# Patient Record
Sex: MALE | Race: WHITE | NOT HISPANIC OR LATINO
[De-identification: names, ages, dates, MRNs, and addresses within clinical notes are randomized per-mention and may not be internally consistent; named-entity substitution may affect disease eponyms.]

---

## 2018-12-04 PROBLEM — Z00.00 ENCOUNTER FOR PREVENTIVE HEALTH EXAMINATION: Status: ACTIVE | Noted: 2018-12-04

## 2018-12-06 ENCOUNTER — TRANSCRIPTION ENCOUNTER (OUTPATIENT)
Age: 43
End: 2018-12-06

## 2018-12-06 ENCOUNTER — APPOINTMENT (OUTPATIENT)
Dept: BARIATRICS | Facility: CLINIC | Age: 43
End: 2018-12-06
Payer: COMMERCIAL

## 2018-12-06 VITALS
HEART RATE: 85 BPM | SYSTOLIC BLOOD PRESSURE: 163 MMHG | TEMPERATURE: 97.8 F | HEIGHT: 68 IN | BODY MASS INDEX: 38.12 KG/M2 | DIASTOLIC BLOOD PRESSURE: 99 MMHG | OXYGEN SATURATION: 100 % | WEIGHT: 251.5 LBS

## 2018-12-06 DIAGNOSIS — I87.1 COMPRESSION OF VEIN: ICD-10-CM

## 2018-12-06 DIAGNOSIS — Z86.718 PERSONAL HISTORY OF OTHER VENOUS THROMBOSIS AND EMBOLISM: ICD-10-CM

## 2018-12-06 DIAGNOSIS — I10 ESSENTIAL (PRIMARY) HYPERTENSION: ICD-10-CM

## 2018-12-06 PROCEDURE — 99203 OFFICE O/P NEW LOW 30 MIN: CPT

## 2018-12-13 ENCOUNTER — APPOINTMENT (OUTPATIENT)
Dept: BARIATRICS | Facility: CLINIC | Age: 43
End: 2018-12-13
Payer: COMMERCIAL

## 2018-12-13 VITALS — WEIGHT: 249 LBS | BODY MASS INDEX: 37.74 KG/M2 | HEIGHT: 68 IN

## 2018-12-13 DIAGNOSIS — E78.00 PURE HYPERCHOLESTEROLEMIA, UNSPECIFIED: ICD-10-CM

## 2018-12-13 PROCEDURE — 97802 MEDICAL NUTRITION INDIV IN: CPT

## 2019-01-16 ENCOUNTER — APPOINTMENT (OUTPATIENT)
Dept: BARIATRICS | Facility: CLINIC | Age: 44
End: 2019-01-16

## 2019-01-23 ENCOUNTER — APPOINTMENT (OUTPATIENT)
Dept: RADIOLOGY | Facility: HOSPITAL | Age: 44
End: 2019-01-23

## 2019-02-11 ENCOUNTER — OTHER (OUTPATIENT)
Age: 44
End: 2019-02-11

## 2019-02-14 ENCOUNTER — APPOINTMENT (OUTPATIENT)
Dept: BARIATRICS | Facility: CLINIC | Age: 44
End: 2019-02-14
Payer: MEDICAID

## 2019-02-14 VITALS
DIASTOLIC BLOOD PRESSURE: 92 MMHG | HEART RATE: 92 BPM | WEIGHT: 260 LBS | SYSTOLIC BLOOD PRESSURE: 136 MMHG | HEIGHT: 68 IN | BODY MASS INDEX: 39.4 KG/M2 | OXYGEN SATURATION: 97 %

## 2019-02-14 DIAGNOSIS — E66.01 MORBID (SEVERE) OBESITY DUE TO EXCESS CALORIES: ICD-10-CM

## 2019-02-14 PROCEDURE — 99213 OFFICE O/P EST LOW 20 MIN: CPT

## 2019-02-22 ENCOUNTER — OTHER (OUTPATIENT)
Age: 44
End: 2019-02-22

## 2019-02-25 VITALS
DIASTOLIC BLOOD PRESSURE: 79 MMHG | SYSTOLIC BLOOD PRESSURE: 121 MMHG | WEIGHT: 253.97 LBS | HEIGHT: 68 IN | HEART RATE: 88 BPM | RESPIRATION RATE: 16 BRPM | TEMPERATURE: 97 F | OXYGEN SATURATION: 97 %

## 2019-02-26 ENCOUNTER — RESULT REVIEW (OUTPATIENT)
Age: 44
End: 2019-02-26

## 2019-02-26 ENCOUNTER — APPOINTMENT (OUTPATIENT)
Dept: BARIATRICS | Facility: HOSPITAL | Age: 44
End: 2019-02-26

## 2019-02-26 ENCOUNTER — INPATIENT (INPATIENT)
Facility: HOSPITAL | Age: 44
LOS: 1 days | Discharge: HOME | DRG: 621 | End: 2019-02-28
Attending: SURGERY | Admitting: SURGERY
Payer: MEDICAID

## 2019-02-26 DIAGNOSIS — Z98.890 OTHER SPECIFIED POSTPROCEDURAL STATES: Chronic | ICD-10-CM

## 2019-02-26 LAB
HCT VFR BLD CALC: 45.1 % — SIGNIFICANT CHANGE UP (ref 39–50)
HGB BLD-MCNC: 14.8 G/DL — SIGNIFICANT CHANGE UP (ref 13–17)
MCHC RBC-ENTMCNC: 28.6 PG — SIGNIFICANT CHANGE UP (ref 27–34)
MCHC RBC-ENTMCNC: 32.8 GM/DL — SIGNIFICANT CHANGE UP (ref 32–36)
MCV RBC AUTO: 87.2 FL — SIGNIFICANT CHANGE UP (ref 80–100)
NRBC # BLD: 0 /100 WBCS — SIGNIFICANT CHANGE UP (ref 0–0)
PLATELET # BLD AUTO: 202 K/UL — SIGNIFICANT CHANGE UP (ref 150–400)
RBC # BLD: 5.17 M/UL — SIGNIFICANT CHANGE UP (ref 4.2–5.8)
RBC # FLD: 11.9 % — SIGNIFICANT CHANGE UP (ref 10.3–14.5)
WBC # BLD: 11.3 K/UL — HIGH (ref 3.8–10.5)
WBC # FLD AUTO: 11.3 K/UL — HIGH (ref 3.8–10.5)

## 2019-02-26 PROCEDURE — 43775 LAP SLEEVE GASTRECTOMY: CPT | Mod: GC

## 2019-02-26 RX ORDER — ENOXAPARIN SODIUM 100 MG/ML
40 INJECTION SUBCUTANEOUS EVERY 12 HOURS
Qty: 0 | Refills: 0 | Status: DISCONTINUED | OUTPATIENT
Start: 2019-02-26 | End: 2019-02-28

## 2019-02-26 RX ORDER — ONDANSETRON 8 MG/1
4 TABLET, FILM COATED ORAL EVERY 6 HOURS
Qty: 0 | Refills: 0 | Status: DISCONTINUED | OUTPATIENT
Start: 2019-02-26 | End: 2019-02-28

## 2019-02-26 RX ORDER — LOSARTAN POTASSIUM 100 MG/1
50 TABLET, FILM COATED ORAL DAILY
Qty: 0 | Refills: 0 | Status: DISCONTINUED | OUTPATIENT
Start: 2019-02-26 | End: 2019-02-28

## 2019-02-26 RX ORDER — ENOXAPARIN SODIUM 100 MG/ML
40 INJECTION SUBCUTANEOUS ONCE
Qty: 0 | Refills: 0 | Status: DISCONTINUED | OUTPATIENT
Start: 2019-02-26 | End: 2019-02-26

## 2019-02-26 RX ORDER — OXYCODONE AND ACETAMINOPHEN 5; 325 MG/1; MG/1
1 TABLET ORAL EVERY 4 HOURS
Qty: 0 | Refills: 0 | Status: DISCONTINUED | OUTPATIENT
Start: 2019-02-26 | End: 2019-02-28

## 2019-02-26 RX ORDER — HYDROMORPHONE HYDROCHLORIDE 2 MG/ML
1 INJECTION INTRAMUSCULAR; INTRAVENOUS; SUBCUTANEOUS EVERY 8 HOURS
Qty: 0 | Refills: 0 | Status: DISCONTINUED | OUTPATIENT
Start: 2019-02-26 | End: 2019-02-27

## 2019-02-26 RX ORDER — LOSARTAN POTASSIUM 100 MG/1
1 TABLET, FILM COATED ORAL
Qty: 0 | Refills: 0 | COMMUNITY

## 2019-02-26 RX ORDER — HYDROMORPHONE HYDROCHLORIDE 2 MG/ML
0.5 INJECTION INTRAMUSCULAR; INTRAVENOUS; SUBCUTANEOUS EVERY 8 HOURS
Qty: 0 | Refills: 0 | Status: DISCONTINUED | OUTPATIENT
Start: 2019-02-26 | End: 2019-02-27

## 2019-02-26 RX ORDER — SODIUM CHLORIDE 9 MG/ML
1000 INJECTION, SOLUTION INTRAVENOUS
Qty: 0 | Refills: 0 | Status: DISCONTINUED | OUTPATIENT
Start: 2019-02-26 | End: 2019-02-27

## 2019-02-26 RX ORDER — ENOXAPARIN SODIUM 100 MG/ML
40 INJECTION SUBCUTANEOUS ONCE
Qty: 0 | Refills: 0 | Status: COMPLETED | OUTPATIENT
Start: 2019-02-26 | End: 2019-02-26

## 2019-02-26 RX ORDER — PANTOPRAZOLE SODIUM 20 MG/1
40 TABLET, DELAYED RELEASE ORAL EVERY 24 HOURS
Qty: 0 | Refills: 0 | Status: DISCONTINUED | OUTPATIENT
Start: 2019-02-26 | End: 2019-02-28

## 2019-02-26 RX ORDER — SCOPALAMINE 1 MG/3D
1 PATCH, EXTENDED RELEASE TRANSDERMAL ONCE
Qty: 0 | Refills: 0 | Status: COMPLETED | OUTPATIENT
Start: 2019-02-26 | End: 2019-02-26

## 2019-02-26 RX ADMIN — HYDROMORPHONE HYDROCHLORIDE 1 MILLIGRAM(S): 2 INJECTION INTRAMUSCULAR; INTRAVENOUS; SUBCUTANEOUS at 23:14

## 2019-02-26 RX ADMIN — PANTOPRAZOLE SODIUM 40 MILLIGRAM(S): 20 TABLET, DELAYED RELEASE ORAL at 14:05

## 2019-02-26 RX ADMIN — SODIUM CHLORIDE 150 MILLILITER(S): 9 INJECTION, SOLUTION INTRAVENOUS at 14:03

## 2019-02-26 RX ADMIN — HYDROMORPHONE HYDROCHLORIDE 0.5 MILLIGRAM(S): 2 INJECTION INTRAMUSCULAR; INTRAVENOUS; SUBCUTANEOUS at 15:01

## 2019-02-26 RX ADMIN — HYDROMORPHONE HYDROCHLORIDE 1 MILLIGRAM(S): 2 INJECTION INTRAMUSCULAR; INTRAVENOUS; SUBCUTANEOUS at 23:38

## 2019-02-26 RX ADMIN — LOSARTAN POTASSIUM 50 MILLIGRAM(S): 100 TABLET, FILM COATED ORAL at 21:16

## 2019-02-26 RX ADMIN — ENOXAPARIN SODIUM 40 MILLIGRAM(S): 100 INJECTION SUBCUTANEOUS at 09:16

## 2019-02-26 RX ADMIN — SCOPALAMINE 1 PATCH: 1 PATCH, EXTENDED RELEASE TRANSDERMAL at 09:16

## 2019-02-26 RX ADMIN — ENOXAPARIN SODIUM 40 MILLIGRAM(S): 100 INJECTION SUBCUTANEOUS at 19:39

## 2019-02-26 RX ADMIN — SCOPALAMINE 1 PATCH: 1 PATCH, EXTENDED RELEASE TRANSDERMAL at 19:08

## 2019-02-26 RX ADMIN — ONDANSETRON 4 MILLIGRAM(S): 8 TABLET, FILM COATED ORAL at 23:14

## 2019-02-26 NOTE — PROGRESS NOTE ADULT - ASSESSMENT
42 yo M w hx of HTN, LLE DVT 2/2 May-Thurner's, s/p L iliac vein placement presenting for elective laparoscopic robot-assisted sleeve gastrectomy'    Plan  Pain/nausea control  BCLD/IVF - LR@ 150  PPI  VTE ppx  - Lovenox 40 BID in 12 hrs after preop dose (hx of LLE DVT)  OOB/Amb/SCDs  Home meds  Nutrition Consults  f/u post op labs  f/u AM labs  - restart plavix if hgb stable

## 2019-02-26 NOTE — H&P ADULT - ASSESSMENT
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

## 2019-02-26 NOTE — BRIEF OPERATIVE NOTE - PROCEDURE
<<-----Click on this checkbox to enter Procedure Laparoscopic sleeve gastrectomy  02/26/2019  robot-assisted  Active  TANYA

## 2019-02-26 NOTE — BRIEF OPERATIVE NOTE - OPERATION/FINDINGS
Procedure: laparoscopic robot-assisted sleeve gastrectomy over 38 Fr bougie    Findings:   - extensive adhesions appreciated against R anterior wall, obscuring view of intended port entry sites  - adhesions taken down before robot positioning  - greater omentum divided off greater curvature of stomach  - from a few cm from pyloric valve and along curvature cephalad toward angle of His  - no hiatal hernia appreciated  - 38 Fr blunt tip bougie placed in stomach along lesser curvature  - sleeve gastrectomy performed using a combination of black and blue cartridges  - upper 1/3 of staple line then oversewn w/ running 2-0 PDS, then greater omentum sewn back on to new greater curvature of stomach  - resected portion of stomach removed and sent to path  - fascia closed with figure of 8 stitch w/ 0 vicryl   - port sites then closed with monocryl and dermabond

## 2019-02-26 NOTE — PROGRESS NOTE ADULT - SUBJECTIVE AND OBJECTIVE BOX
POST-OPERATIVE NOTE    Procedure: Laparoscopic sleeve gastrectomy     Diagnosis/Indication: morbid obesity     Surgeon: Dr. Mckeon     S: Pt has no complaints. Denies CP, SOB, CASTRO, calf tenderness. Pain controlled with medication.    O:  T(C): 36 (02-26-19 @ 13:45), Max: 36 (02-26-19 @ 13:45)  T(F): 96.8 (02-26-19 @ 13:45), Max: 96.8 (02-26-19 @ 13:45)  HR: 80 (02-26-19 @ 16:00) (74 - 94)  BP: 145/83 (02-26-19 @ 16:00) (119/67 - 150/82)  RR: 14 (02-26-19 @ 16:00) (10 - 15)  SpO2: 94% (02-26-19 @ 16:00) (94% - 99%)  Wt(kg): --            Gen: NAD, resting comfortably in bed  C/V: NSR  Pulm: Nonlabored breathing, no respiratory distress  Abd:  Soft, non- distended, TTP around incision site, incision clean dry and intact   Extrem: WWP, no calf edema, SCDs in place

## 2019-02-26 NOTE — H&P ADULT - HISTORY OF PRESENT ILLNESS
42 yo M w hx of HTN, LLE DVT 2/2 May-Thurner's, s/p L iliac vein placement presenting for elective laparoscopic robot-assisted sleeve gastrectomy after failing significant weight loss with a regimen of diet and exercise. Patient underwent a complete evaluation including medical, psychological, and nutritional clearance. No complaints or concerns at this time.     PMH: HTN, DVT, May-Thurner's  Meds: Cozaar, Plavix  PSH: Liposuction, L iliac vein stent placement  Allergies: None

## 2019-02-27 ENCOUNTER — TRANSCRIPTION ENCOUNTER (OUTPATIENT)
Age: 44
End: 2019-02-27

## 2019-02-27 LAB
ANION GAP SERPL CALC-SCNC: 10 MMOL/L — SIGNIFICANT CHANGE UP (ref 5–17)
BUN SERPL-MCNC: 10 MG/DL — SIGNIFICANT CHANGE UP (ref 7–23)
CALCIUM SERPL-MCNC: 9.3 MG/DL — SIGNIFICANT CHANGE UP (ref 8.4–10.5)
CHLORIDE SERPL-SCNC: 102 MMOL/L — SIGNIFICANT CHANGE UP (ref 96–108)
CO2 SERPL-SCNC: 27 MMOL/L — SIGNIFICANT CHANGE UP (ref 22–31)
CREAT SERPL-MCNC: 0.82 MG/DL — SIGNIFICANT CHANGE UP (ref 0.5–1.3)
GLUCOSE SERPL-MCNC: 123 MG/DL — HIGH (ref 70–99)
HCT VFR BLD CALC: 42.1 % — SIGNIFICANT CHANGE UP (ref 39–50)
HGB BLD-MCNC: 13.6 G/DL — SIGNIFICANT CHANGE UP (ref 13–17)
MAGNESIUM SERPL-MCNC: 1.9 MG/DL — SIGNIFICANT CHANGE UP (ref 1.6–2.6)
MCHC RBC-ENTMCNC: 28.3 PG — SIGNIFICANT CHANGE UP (ref 27–34)
MCHC RBC-ENTMCNC: 32.3 GM/DL — SIGNIFICANT CHANGE UP (ref 32–36)
MCV RBC AUTO: 87.7 FL — SIGNIFICANT CHANGE UP (ref 80–100)
NRBC # BLD: 0 /100 WBCS — SIGNIFICANT CHANGE UP (ref 0–0)
PHOSPHATE SERPL-MCNC: 3.8 MG/DL — SIGNIFICANT CHANGE UP (ref 2.5–4.5)
PLATELET # BLD AUTO: 203 K/UL — SIGNIFICANT CHANGE UP (ref 150–400)
POTASSIUM SERPL-MCNC: 4.6 MMOL/L — SIGNIFICANT CHANGE UP (ref 3.5–5.3)
POTASSIUM SERPL-SCNC: 4.6 MMOL/L — SIGNIFICANT CHANGE UP (ref 3.5–5.3)
RBC # BLD: 4.8 M/UL — SIGNIFICANT CHANGE UP (ref 4.2–5.8)
RBC # FLD: 12.1 % — SIGNIFICANT CHANGE UP (ref 10.3–14.5)
SODIUM SERPL-SCNC: 139 MMOL/L — SIGNIFICANT CHANGE UP (ref 135–145)
WBC # BLD: 12.26 K/UL — HIGH (ref 3.8–10.5)
WBC # FLD AUTO: 12.26 K/UL — HIGH (ref 3.8–10.5)

## 2019-02-27 RX ORDER — OXYCODONE AND ACETAMINOPHEN 5; 325 MG/1; MG/1
2 TABLET ORAL EVERY 4 HOURS
Qty: 0 | Refills: 0 | Status: DISCONTINUED | OUTPATIENT
Start: 2019-02-27 | End: 2019-02-28

## 2019-02-27 RX ORDER — OMEPRAZOLE 10 MG/1
1 CAPSULE, DELAYED RELEASE ORAL
Qty: 30 | Refills: 0 | OUTPATIENT
Start: 2019-02-27 | End: 2019-03-28

## 2019-02-27 RX ORDER — CLOPIDOGREL BISULFATE 75 MG/1
1 TABLET, FILM COATED ORAL
Qty: 0 | Refills: 0 | COMMUNITY

## 2019-02-27 RX ORDER — OXYCODONE AND ACETAMINOPHEN 5; 325 MG/1; MG/1
1 TABLET ORAL EVERY 4 HOURS
Qty: 0 | Refills: 0 | Status: DISCONTINUED | OUTPATIENT
Start: 2019-02-27 | End: 2019-02-27

## 2019-02-27 RX ORDER — SODIUM CHLORIDE 9 MG/ML
1000 INJECTION, SOLUTION INTRAVENOUS
Qty: 0 | Refills: 0 | Status: DISCONTINUED | OUTPATIENT
Start: 2019-02-27 | End: 2019-02-28

## 2019-02-27 RX ORDER — ASPIRIN/CALCIUM CARB/MAGNESIUM 324 MG
81 TABLET ORAL DAILY
Qty: 0 | Refills: 0 | Status: DISCONTINUED | OUTPATIENT
Start: 2019-02-27 | End: 2019-02-28

## 2019-02-27 RX ADMIN — LOSARTAN POTASSIUM 50 MILLIGRAM(S): 100 TABLET, FILM COATED ORAL at 21:48

## 2019-02-27 RX ADMIN — ENOXAPARIN SODIUM 40 MILLIGRAM(S): 100 INJECTION SUBCUTANEOUS at 17:45

## 2019-02-27 RX ADMIN — ONDANSETRON 4 MILLIGRAM(S): 8 TABLET, FILM COATED ORAL at 09:15

## 2019-02-27 RX ADMIN — ENOXAPARIN SODIUM 40 MILLIGRAM(S): 100 INJECTION SUBCUTANEOUS at 05:21

## 2019-02-27 RX ADMIN — ONDANSETRON 4 MILLIGRAM(S): 8 TABLET, FILM COATED ORAL at 23:26

## 2019-02-27 RX ADMIN — SCOPALAMINE 1 PATCH: 1 PATCH, EXTENDED RELEASE TRANSDERMAL at 18:54

## 2019-02-27 RX ADMIN — PANTOPRAZOLE SODIUM 40 MILLIGRAM(S): 20 TABLET, DELAYED RELEASE ORAL at 12:33

## 2019-02-27 RX ADMIN — HYDROMORPHONE HYDROCHLORIDE 0.5 MILLIGRAM(S): 2 INJECTION INTRAMUSCULAR; INTRAVENOUS; SUBCUTANEOUS at 12:26

## 2019-02-27 RX ADMIN — SCOPALAMINE 1 PATCH: 1 PATCH, EXTENDED RELEASE TRANSDERMAL at 07:14

## 2019-02-27 RX ADMIN — Medication 81 MILLIGRAM(S): at 19:01

## 2019-02-27 RX ADMIN — HYDROMORPHONE HYDROCHLORIDE 0.5 MILLIGRAM(S): 2 INJECTION INTRAMUSCULAR; INTRAVENOUS; SUBCUTANEOUS at 12:39

## 2019-02-27 RX ADMIN — ONDANSETRON 4 MILLIGRAM(S): 8 TABLET, FILM COATED ORAL at 16:41

## 2019-02-27 NOTE — DISCHARGE NOTE PROVIDER - HOSPITAL COURSE
44 yo M w hx of HTN, LLE DVT 2/2 May-Thurner's, s/p L iliac vein stent placement (taking Plavix) presenting for elective laparoscopic robot-assisted sleeve gastrectomy after failing significant weight loss with a regimen of diet and exercise. Underwent this operation on 2/26. Diet was advanced to bariatric clear liquids. At time of discharge the patient was tolerating an adequate amount of po intake and was stable and ready for discharge with follow-up as outpatient.        Patient's May Thurner is being managed by Dr. Rick Lu at [facility]. Prior to discharge, contacted Dr. Lu's office about DVT tx/prophylaxis about d/c recs. Dr. Lu's associate Dr. Velasco informed us that patient was already supposed to have stopped Plavix, and should only be taking ASA 81 qD (patient had missed follow up appointment).

## 2019-02-27 NOTE — DISCHARGE NOTE PROVIDER - CARE PROVIDER_API CALL
Sang Mckeon (MD)  Surgery  [address]  [place], [place] [address]  Phone: [phone]  Fax: [phone]  Follow Up Time:

## 2019-02-27 NOTE — PROGRESS NOTE ADULT - ASSESSMENT
44 yo M w hx of HTN, LLE DVT 2/2 May-Thurner's, s/p L iliac vein placement presenting for elective laparoscopic robot-assisted sleeve gastrectomy'    Plan  Pain/nausea control  BCLD/IVF - LR@ 150  PPI  VTE ppx  - Lovenox 40 BID in 12 hrs after preop dose (hx of LLE DVT)  OOB/Amb/SCDs  Home meds  Nutrition Consults  f/u post op labs  f/u AM labs  - restart Plavix if hgb stable

## 2019-02-27 NOTE — PROGRESS NOTE ADULT - SUBJECTIVE AND OBJECTIVE BOX
OVERNIGHT EVENTS:  pain controlled, post op h/h: 14.8/45.1, encouraged pt to ambulate, keep track of po intake, use IS and wear SCDs while in bed, passed TOV (700cc)  2/26: s/p sleeve gastrectomy; preop hgb 14.9, POC wnl    STATUS POST:  Laparoscopic sleeve gastrectomy     POST OPERATIVE DAY #:  1    SUBJECTIVE:  Patient examined bedside with chief resident. Patient complains of incisional pain .Patient denies chest pain, SOB, nausea and vomiting.  Patient making adequate urine.      enoxaparin Injectable 40 milliGRAM(s) SubCutaneous every 12 hours  losartan 50 milliGRAM(s) Oral daily      Vital Signs Last 24 Hrs  T(C): 36.9 (27 Feb 2019 08:45), Max: 37.4 (26 Feb 2019 20:10)  T(F): 98.5 (27 Feb 2019 08:45), Max: 99.3 (26 Feb 2019 20:10)  HR: 78 (27 Feb 2019 08:45) (74 - 104)  BP: 136/76 (27 Feb 2019 08:45) (99/65 - 150/82)  BP(mean): 106 (26 Feb 2019 14:45) (97 - 126)  RR: 17 (27 Feb 2019 08:45) (10 - 24)  SpO2: 98% (27 Feb 2019 08:45) (92% - 99%)  I&O's Detail    26 Feb 2019 07:01  -  27 Feb 2019 07:00  --------------------------------------------------------  IN:    lactated ringers.: 1950 mL    Oral Fluid: 280 mL  Total IN: 2230 mL    OUT:    Voided: 1600 mL  Total OUT: 1600 mL    Total NET: 630 mL      27 Feb 2019 07:01  -  27 Feb 2019 09:47  --------------------------------------------------------  IN:  Total IN: 0 mL    OUT:    Voided: 600 mL  Total OUT: 600 mL    Total NET: -600 mL          General: NAD, resting comfortably in bed  C/V: NSR  Pulm: Nonlabored breathing, no respiratory distress  Abd:  Soft, non- distended, TTP around incision site, incision clean dry and intact   Extrem: WWP, no edema, SCDs in place        LABS:                        13.6   12.26 )-----------( 203      ( 27 Feb 2019 05:51 )             42.1     02-27    139  |  102  |  10  ----------------------------<  123<H>  4.6   |  27  |  0.82    Ca    9.3      27 Feb 2019 05:51  Phos  3.8     02-27  Mg     1.9     02-27            RADIOLOGY & ADDITIONAL STUDIES:

## 2019-02-27 NOTE — DIETITIAN INITIAL EVALUATION ADULT. - ENERGY NEEDS
Height: 5'8" Weight: 254lbs, IBW 154lbs+/-10%, %%, BMI 38.6  IBW used for calculations as pt >120% of IBW   Above energy needs calculated for wt maintenance (20-25kcal/kg).   Weeks 1-2 estimated needs: 698-838kcal/day (10-12kcal/kg), 105-147g pro/day (1.5-2.1g/kg), >/=64oz clear fluids.

## 2019-02-27 NOTE — DISCHARGE NOTE PROVIDER - NSDCFUADDINST_GEN_ALL_CORE_FT
Follow up with Dr. Mckeon in 1 week. Call the office at  to schedule your appointment. You may shower; soap and water over incision sites. Do not scrub. Pat dry when done. No tub bathing or swimming until cleared. Keep incision sites out of the sun as scars will darken. No heavy lifting (>10lbs) or strenuous exercise. Diet: Bariatric Full Fluids. 60 grams protein daily.  64 fluid ounces water daily. Drink small sips throughout the day. Continue diet as outlined by paperwork received as a pre-operative patient. You should be urinating at least 3-4x per day. Call the office if you experience increasing abdominal pain, nausea, vomiting, or temperature >100.4F.  NO NSAIDs besides Aspirin 81 mg until approved by Dr. Mckeon. Avoid alcoholic beverages until cleared by Dr. Mckeon.    For your history of deep vein thrombosis and stenting, we spoke with Dr. Lu's office who conveyed that you may stop taking your Plavix, and instead take Aspirin 81 mg per day. Please also follow up with Dr. Lu. Call his office at [phone] to schedule an appointment.

## 2019-02-27 NOTE — DISCHARGE NOTE PROVIDER - NSDCACTIVITY_GEN_ALL_CORE
Showering allowed Walking - Outdoors allowed/Do not drive or operate machinery/No heavy lifting/straining/Stairs allowed/Walking - Indoors allowed/Showering allowed

## 2019-02-28 ENCOUNTER — TRANSCRIPTION ENCOUNTER (OUTPATIENT)
Age: 44
End: 2019-02-28

## 2019-02-28 VITALS
OXYGEN SATURATION: 97 % | HEART RATE: 81 BPM | RESPIRATION RATE: 17 BRPM | DIASTOLIC BLOOD PRESSURE: 93 MMHG | SYSTOLIC BLOOD PRESSURE: 137 MMHG | TEMPERATURE: 99 F

## 2019-02-28 LAB
ANION GAP SERPL CALC-SCNC: 8 MMOL/L — SIGNIFICANT CHANGE UP (ref 5–17)
BUN SERPL-MCNC: 10 MG/DL — SIGNIFICANT CHANGE UP (ref 7–23)
CALCIUM SERPL-MCNC: 9.2 MG/DL — SIGNIFICANT CHANGE UP (ref 8.4–10.5)
CHLORIDE SERPL-SCNC: 100 MMOL/L — SIGNIFICANT CHANGE UP (ref 96–108)
CO2 SERPL-SCNC: 30 MMOL/L — SIGNIFICANT CHANGE UP (ref 22–31)
CREAT SERPL-MCNC: 0.95 MG/DL — SIGNIFICANT CHANGE UP (ref 0.5–1.3)
GLUCOSE SERPL-MCNC: 113 MG/DL — HIGH (ref 70–99)
HCT VFR BLD CALC: 41.3 % — SIGNIFICANT CHANGE UP (ref 39–50)
HGB BLD-MCNC: 13.2 G/DL — SIGNIFICANT CHANGE UP (ref 13–17)
MAGNESIUM SERPL-MCNC: 2 MG/DL — SIGNIFICANT CHANGE UP (ref 1.6–2.6)
MCHC RBC-ENTMCNC: 28.1 PG — SIGNIFICANT CHANGE UP (ref 27–34)
MCHC RBC-ENTMCNC: 32 GM/DL — SIGNIFICANT CHANGE UP (ref 32–36)
MCV RBC AUTO: 87.9 FL — SIGNIFICANT CHANGE UP (ref 80–100)
NRBC # BLD: 0 /100 WBCS — SIGNIFICANT CHANGE UP (ref 0–0)
PHOSPHATE SERPL-MCNC: 2.9 MG/DL — SIGNIFICANT CHANGE UP (ref 2.5–4.5)
PLATELET # BLD AUTO: 183 K/UL — SIGNIFICANT CHANGE UP (ref 150–400)
POTASSIUM SERPL-MCNC: 4.7 MMOL/L — SIGNIFICANT CHANGE UP (ref 3.5–5.3)
POTASSIUM SERPL-SCNC: 4.7 MMOL/L — SIGNIFICANT CHANGE UP (ref 3.5–5.3)
RBC # BLD: 4.7 M/UL — SIGNIFICANT CHANGE UP (ref 4.2–5.8)
RBC # FLD: 12 % — SIGNIFICANT CHANGE UP (ref 10.3–14.5)
SODIUM SERPL-SCNC: 138 MMOL/L — SIGNIFICANT CHANGE UP (ref 135–145)
WBC # BLD: 10.92 K/UL — HIGH (ref 3.8–10.5)
WBC # FLD AUTO: 10.92 K/UL — HIGH (ref 3.8–10.5)

## 2019-02-28 PROCEDURE — C9399: CPT

## 2019-02-28 PROCEDURE — 86900 BLOOD TYPING SEROLOGIC ABO: CPT

## 2019-02-28 PROCEDURE — 84100 ASSAY OF PHOSPHORUS: CPT

## 2019-02-28 PROCEDURE — 80048 BASIC METABOLIC PNL TOTAL CA: CPT

## 2019-02-28 PROCEDURE — S2900: CPT

## 2019-02-28 PROCEDURE — 86901 BLOOD TYPING SEROLOGIC RH(D): CPT

## 2019-02-28 PROCEDURE — 88307 TISSUE EXAM BY PATHOLOGIST: CPT

## 2019-02-28 PROCEDURE — 83735 ASSAY OF MAGNESIUM: CPT

## 2019-02-28 PROCEDURE — 86850 RBC ANTIBODY SCREEN: CPT

## 2019-02-28 PROCEDURE — 85027 COMPLETE CBC AUTOMATED: CPT

## 2019-02-28 PROCEDURE — 36415 COLL VENOUS BLD VENIPUNCTURE: CPT

## 2019-02-28 RX ORDER — ASPIRIN/CALCIUM CARB/MAGNESIUM 324 MG
1 TABLET ORAL
Qty: 0 | Refills: 0 | COMMUNITY
Start: 2019-02-28

## 2019-02-28 RX ORDER — ACETAMINOPHEN 500 MG
1000 TABLET ORAL ONCE
Qty: 0 | Refills: 0 | Status: COMPLETED | OUTPATIENT
Start: 2019-02-28 | End: 2019-02-28

## 2019-02-28 RX ADMIN — Medication 400 MILLIGRAM(S): at 02:24

## 2019-02-28 RX ADMIN — ENOXAPARIN SODIUM 40 MILLIGRAM(S): 100 INJECTION SUBCUTANEOUS at 06:22

## 2019-02-28 RX ADMIN — Medication 1000 MILLIGRAM(S): at 02:40

## 2019-02-28 NOTE — DISCHARGE NOTE NURSING/CASE MANAGEMENT/SOCIAL WORK - NSDCPNDISPN_GEN_ALL_CORE
Education provided on the pain management plan of care/Side effects of pain management treatment/Safe use, storage and disposal of opioids when prescribed/Activities of daily living, including home environment that might     exacerbate pain or reduce effectiveness of the pain management plan of care as well as strategies to address these issues

## 2019-02-28 NOTE — PROGRESS NOTE ADULT - SUBJECTIVE AND OBJECTIVE BOX
OVERNIGHT EVENTS:  started aspirin 81 mg per vascular surgeon , tolerating diet, VSS, VALENTINO  2/27: Tolerating diet , pain controlled, OOB/AMB , possible dc , VSS    STATUS POST:  Robotic sleeve gastrectomy     POST OPERATIVE DAY #: 2    SUBJECTIVE: Patient examined bedside with chief resident. Patient reports incisional pain improved and tolerating diet. Patient denies SOB, chest pain, nausea and vomiting.  Patient making adequate urine.        aspirin enteric coated 81 milliGRAM(s) Oral daily  enoxaparin Injectable 40 milliGRAM(s) SubCutaneous every 12 hours  losartan 50 milliGRAM(s) Oral daily      Vital Signs Last 24 Hrs  T(C): 36.9 (28 Feb 2019 06:02), Max: 37.6 (27 Feb 2019 17:55)  T(F): 98.4 (28 Feb 2019 06:02), Max: 99.7 (27 Feb 2019 17:55)  HR: 87 (28 Feb 2019 06:02) (75 - 87)  BP: 123/69 (28 Feb 2019 06:02) (123/69 - 148/92)  BP(mean): --  RR: 17 (28 Feb 2019 06:02) (16 - 17)  SpO2: 95% (28 Feb 2019 06:02) (94% - 98%)  I&O's Detail    27 Feb 2019 07:01  -  28 Feb 2019 07:00  --------------------------------------------------------  IN:    lactated ringers.: 600 mL    lactated ringers.: 600 mL    Oral Fluid: 600 mL  Total IN: 1800 mL    OUT:    Voided: 1750 mL  Total OUT: 1750 mL    Total NET: 50 mL          General: NAD, resting comfortably in bed  C/V: NSR  Pulm: Nonlabored breathing, no respiratory distress  Abd:  Soft, non- distended, TTP around incision site, incision clean dry and intact   Extrem: WWP, no edema, SCDs in place      LABS:                        13.2   10.92 )-----------( 183      ( 28 Feb 2019 05:46 )             41.3     02-28    138  |  100  |  10  ----------------------------<  113<H>  4.7   |  30  |  0.95    Ca    9.2      28 Feb 2019 05:46  Phos  2.9     02-28  Mg     2.0     02-28            RADIOLOGY & ADDITIONAL STUDIES:

## 2019-02-28 NOTE — DISCHARGE NOTE NURSING/CASE MANAGEMENT/SOCIAL WORK - NSDCDPATPORTLINK_GEN_ALL_CORE
You can access the [facility] Portal, offered by [facility], by registering with the following website: [email or web address]

## 2019-02-28 NOTE — PROGRESS NOTE ADULT - ASSESSMENT
44 yo M w hx of HTN, LLE DVT 2/2 May-Thurner's, s/p L iliac vein stent placement presenting for elective laparoscopic robot-assisted sleeve gastrectomy'      Pain/nausea control  BCLD/IVF   PPI  VTE ppx  Lovenox 40 BID in 12 hrs   OOB/Amb/SCDs  Home meds  f/u AM labs  -ASA 81mg

## 2019-03-04 DIAGNOSIS — Z86.718 PERSONAL HISTORY OF OTHER VENOUS THROMBOSIS AND EMBOLISM: ICD-10-CM

## 2019-03-04 DIAGNOSIS — I10 ESSENTIAL (PRIMARY) HYPERTENSION: ICD-10-CM

## 2019-03-04 DIAGNOSIS — I77.1 STRICTURE OF ARTERY: ICD-10-CM

## 2019-03-04 DIAGNOSIS — E66.01 MORBID (SEVERE) OBESITY DUE TO EXCESS CALORIES: ICD-10-CM

## 2019-03-04 DIAGNOSIS — Z79.02 LONG TERM (CURRENT) USE OF ANTITHROMBOTICS/ANTIPLATELETS: ICD-10-CM

## 2019-03-06 ENCOUNTER — APPOINTMENT (OUTPATIENT)
Dept: BARIATRICS | Facility: CLINIC | Age: 44
End: 2019-03-06
Payer: MEDICAID

## 2019-03-06 VITALS
HEART RATE: 85 BPM | BODY MASS INDEX: 36.87 KG/M2 | OXYGEN SATURATION: 98 % | HEIGHT: 68 IN | SYSTOLIC BLOOD PRESSURE: 125 MMHG | DIASTOLIC BLOOD PRESSURE: 82 MMHG | WEIGHT: 243.31 LBS | TEMPERATURE: 97.1 F

## 2019-03-06 DIAGNOSIS — Z09 ENCOUNTER FOR FOLLOW-UP EXAMINATION AFTER COMPLETED TREATMENT FOR CONDITIONS OTHER THAN MALIGNANT NEOPLASM: ICD-10-CM

## 2019-03-06 PROCEDURE — 99024 POSTOP FOLLOW-UP VISIT: CPT

## 2019-03-06 RX ORDER — CLOPIDOGREL 75 MG/1
75 TABLET, FILM COATED ORAL
Refills: 0 | Status: ACTIVE | COMMUNITY

## 2019-03-06 RX ORDER — LOSARTAN POTASSIUM 50 MG/1
50 TABLET, FILM COATED ORAL
Refills: 0 | Status: ACTIVE | COMMUNITY

## 2019-03-07 PROBLEM — I82.409 ACUTE EMBOLISM AND THROMBOSIS OF UNSPECIFIED DEEP VEINS OF UNSPECIFIED LOWER EXTREMITY: Chronic | Status: ACTIVE | Noted: 2019-02-26

## 2019-03-07 PROBLEM — I10 ESSENTIAL (PRIMARY) HYPERTENSION: Chronic | Status: ACTIVE | Noted: 2019-02-25

## 2019-03-07 PROBLEM — E66.01 MORBID (SEVERE) OBESITY DUE TO EXCESS CALORIES: Chronic | Status: ACTIVE | Noted: 2019-02-25

## 2019-03-07 PROBLEM — I87.1 COMPRESSION OF VEIN: Chronic | Status: ACTIVE | Noted: 2019-02-26

## 2019-03-07 PROBLEM — Z09 POSTOP CHECK: Status: ACTIVE | Noted: 2019-03-07

## 2019-03-07 LAB — SURGICAL PATHOLOGY STUDY: SIGNIFICANT CHANGE UP

## 2019-03-07 NOTE — HISTORY OF PRESENT ILLNESS
[de-identified] : pt presents 1 wk s/p lap vsg, no complaints, on stage 2 diet, moving bowels normally, on baby asa, off plavix per his healthcare team per patient. Patient is getting at least 60 grams of protein and 64 oz of fluids.  Walking every hour.  Denies fever, chills, n/v, cp, sob, abd pain, leg pain, lightheadedness, dizziness, calf pain, HA, heartburn

## 2019-03-10 PROBLEM — E66.01 MORBID OBESITY DUE TO EXCESS CALORIES: Status: ACTIVE | Noted: 2018-12-06

## 2019-03-10 NOTE — HISTORY OF PRESENT ILLNESS
[de-identified] : Scheduled for a laparoscopic sleeve gastrectomy. Has been medically cleared. upper GI study is normal. Recommendations from the hematologist noted.

## 2019-03-10 NOTE — ASSESSMENT
[FreeTextEntry1] : I have answered all of his questions regarding the operation and the perioperative instructions.

## 2019-03-28 ENCOUNTER — APPOINTMENT (OUTPATIENT)
Dept: BARIATRICS | Facility: CLINIC | Age: 44
End: 2019-03-28
Payer: MEDICAID

## 2019-03-28 VITALS
WEIGHT: 230.5 LBS | DIASTOLIC BLOOD PRESSURE: 83 MMHG | SYSTOLIC BLOOD PRESSURE: 133 MMHG | HEART RATE: 75 BPM | HEIGHT: 68 IN | OXYGEN SATURATION: 99 % | BODY MASS INDEX: 34.93 KG/M2

## 2019-03-28 DIAGNOSIS — Z98.84 BARIATRIC SURGERY STATUS: ICD-10-CM

## 2019-03-28 PROCEDURE — 99024 POSTOP FOLLOW-UP VISIT: CPT

## 2019-04-13 PROBLEM — Z98.84 S/P BARIATRIC SURGERY: Status: ACTIVE | Noted: 2019-03-07

## 2019-04-13 NOTE — HISTORY OF PRESENT ILLNESS
[de-identified] : Patient is doing well and has no complaints. He is taking in his protein shakes and vitamins. He is starting to exercise. [Procedure: ___] : Procedure performed: [unfilled]  [Date of Surgery: ___] : Date of Surgery:   [unfilled] [Surgeon Name:   ___] : Surgeon Name: Dr. KELLY

## 2019-04-13 NOTE — PHYSICAL EXAM
[Obese, well nourished, in no acute distress] : obese, well nourished, in no acute distress [Normal] : normoactive bowel sounds, soft and nontender, no hepatosplenomegaly or masses appreciated. Incisions healing appropriately without erythema or drainage

## 2019-04-13 NOTE — ASSESSMENT
[FreeTextEntry1] : He was told to advance his diet as per protocol and to start a regular exercise regimen.

## 2019-04-25 NOTE — PHYSICAL EXAM
[Obese, well nourished, in no acute distress] : obese, well nourished, in no acute distress [Normal] : affect appropriate details… No joint pain, swelling or deformity; no limitation of movement

## 2019-12-19 ENCOUNTER — MESSAGE (OUTPATIENT)
Age: 44
End: 2019-12-19

## 2020-04-20 NOTE — PACU DISCHARGE NOTE - HYDRATION STATUS:
Patient here today for nurse blood pressure check.  Last dose of BP medication taken:  Metoprolol tartrate 25 mg 1 tab every 12 hours. Last taken on 4/20/2020 at 1000.  Hydrochlorothiazide 25 mg 1 tab daily last taken 4/20/2020 at 1000.           BP Readings from Last 1 Encounters:   04/20/20 1117 102/70          Pulse Readings from Last 1 Encounters:   04/20/20 62         Last Visit for this condition 9/26/2019  Per that visit plan of care continue taking medications.   Next visit is scheduled for: 4/23/2020     Current Medications are: see med list  Last refilled: -     Please review and advise on plan of care.  Next Nurse visit scheduled No.     Satisfactory

## 2021-06-13 NOTE — PACU DISCHARGE NOTE - NSPTMEETSDISCHCRITERIADT_GEN_A_CORE
Chaim Dee,    It was a pleasure to see you today at the [facility].     My recommendations after this visit include:    Same medications    WMalinda Cevallos MD, FACC, CRUZITO           26-Feb-2019 17:17

## 2021-10-06 PROBLEM — I10 ESSENTIAL HYPERTENSION: Status: ACTIVE | Noted: 2018-12-06

## 2023-02-08 ENCOUNTER — APPOINTMENT (OUTPATIENT)
Dept: UROLOGY | Facility: CLINIC | Age: 48
End: 2023-02-08
Payer: COMMERCIAL

## 2023-02-08 VITALS
BODY MASS INDEX: 26.46 KG/M2 | TEMPERATURE: 97.5 F | HEART RATE: 79 BPM | WEIGHT: 189 LBS | RESPIRATION RATE: 14 BRPM | HEIGHT: 71 IN | OXYGEN SATURATION: 98 % | SYSTOLIC BLOOD PRESSURE: 116 MMHG | DIASTOLIC BLOOD PRESSURE: 74 MMHG

## 2023-02-08 DIAGNOSIS — R39.198 OTHER DIFFICULTIES WITH MICTURITION: ICD-10-CM

## 2023-02-08 DIAGNOSIS — Z78.9 OTHER SPECIFIED HEALTH STATUS: ICD-10-CM

## 2023-02-08 PROCEDURE — 99204 OFFICE O/P NEW MOD 45 MIN: CPT

## 2023-02-08 PROCEDURE — 51798 US URINE CAPACITY MEASURE: CPT

## 2023-02-08 NOTE — HISTORY OF PRESENT ILLNESS
[Urinary Urgency] : urinary urgency [Weak Stream] : weak stream [FreeTextEntry1] : This is a 47-year-old  male with approximate 1 year history of urinary hesitancy, slow urinary stream and sensation of incomplete bladder emptying.  He denies dysuria, hematuria, suprapubic or flank pain or nocturia.  The patient is also reporting decreased libido and and decreased ejaculatory volume.  He is able to achieve adequate erections.  He denies family history of prostate cancer.  The patient had a abdominal ultrasound on 12/17/2018 which revealed a suggestion of possible left renal mass

## 2023-02-08 NOTE — ASSESSMENT
[FreeTextEntry1] : The patient has lower urinary tract symptoms with good bladder emptying, and a physical examination consistent with likely early BPH.  He has adequate erectile function but decreased libido.  We will send his serum for baseline PSA determination as well as testosterone panel.  He will be placed on alfuzosin, 10 mg daily and will come back in 2 months for reassessment.  If his voiding symptoms are significantly improved he may continue or opt for assessment for minimally invasive BPH procedure if he is not too keen on long-term medication.

## 2023-02-09 LAB
ALBUMIN SERPL ELPH-MCNC: 4.6 G/DL
ESTRADIOL SERPL-MCNC: 30 PG/ML
FSH SERPL-MCNC: 3.9 IU/L
LH SERPL-ACNC: 4.6 IU/L
PSA SERPL-MCNC: 0.31 NG/ML
TESTOST SERPL-MCNC: 572 NG/DL

## 2023-02-10 LAB — SHBG SERPL-SCNC: 50.5 NMOL/L

## 2023-04-19 ENCOUNTER — APPOINTMENT (OUTPATIENT)
Dept: UROLOGY | Facility: CLINIC | Age: 48
End: 2023-04-19
Payer: COMMERCIAL

## 2023-04-19 VITALS
SYSTOLIC BLOOD PRESSURE: 118 MMHG | DIASTOLIC BLOOD PRESSURE: 80 MMHG | HEIGHT: 71 IN | WEIGHT: 189 LBS | HEART RATE: 76 BPM | TEMPERATURE: 98 F | BODY MASS INDEX: 26.46 KG/M2

## 2023-04-19 PROCEDURE — 51741 ELECTRO-UROFLOWMETRY FIRST: CPT

## 2023-04-19 PROCEDURE — 51798 US URINE CAPACITY MEASURE: CPT

## 2023-04-19 PROCEDURE — 99214 OFFICE O/P EST MOD 30 MIN: CPT

## 2023-04-20 NOTE — ASSESSMENT
[FreeTextEntry1] : The patient has mild lower urinary tract symptoms and good bladder emptying. We will  continue him on daily alfuzosin. We discussed minimally invasive BPH procedure but he prefers long-term medication for now. I encouraged him to maintain adequate daily fluid intake.  The patient will be scheduled for CT scan of abdomen and pelvis for better evaluation of the heterogeneous region in the left kidney seen on ultrasound; if that is unremarkable, we will reevaluate again in 6 months.\par \par

## 2023-04-20 NOTE — ADDENDUM
[FreeTextEntry1] : Next visit: uroflow,PVR, \par \par \par Yuly RILEY completed this note as a scribe on behalf of Dr. Enrique Dobson M.D. \par \par The documentation recorded by the scribe accuracy reflects the service I personally preformed and the decisions made by me.

## 2023-04-20 NOTE — HISTORY OF PRESENT ILLNESS
[Urinary Urgency] : urinary urgency [Weak Stream] : weak stream [FreeTextEntry1] : Patient presents with improved voiding pattern. He denies dysuria, gross hematuria, suprapubic pain, flank pain or nocturia. Patient continues to take daily alfuzosin. His daily fluid intake is adequate. PSA on 02/09/2023 was favorable 0.31 ng/mL. Testosterone on 02/08/2023 was within normal range. \par

## 2023-08-18 NOTE — PRE-OP CHECKLIST - AS TEMP SITE
Please stop at the scheduling desk on the left hand side as you head out to schedule your appointment with orthopedics.    
temporal

## 2023-10-18 ENCOUNTER — APPOINTMENT (OUTPATIENT)
Dept: UROLOGY | Facility: CLINIC | Age: 48
End: 2023-10-18
Payer: COMMERCIAL

## 2023-10-18 VITALS
HEIGHT: 71 IN | BODY MASS INDEX: 25.06 KG/M2 | TEMPERATURE: 97.3 F | WEIGHT: 179 LBS | DIASTOLIC BLOOD PRESSURE: 82 MMHG | HEART RATE: 72 BPM | SYSTOLIC BLOOD PRESSURE: 117 MMHG

## 2023-10-18 DIAGNOSIS — R68.82 DECREASED LIBIDO: ICD-10-CM

## 2023-10-18 DIAGNOSIS — N28.1 CYST OF KIDNEY, ACQUIRED: ICD-10-CM

## 2023-10-18 DIAGNOSIS — N28.89 OTHER SPECIFIED DISORDERS OF KIDNEY AND URETER: ICD-10-CM

## 2023-10-18 DIAGNOSIS — N40.1 BENIGN PROSTATIC HYPERPLASIA WITH LOWER URINARY TRACT SYMPMS: ICD-10-CM

## 2023-10-18 DIAGNOSIS — N52.9 MALE ERECTILE DYSFUNCTION, UNSPECIFIED: ICD-10-CM

## 2023-10-18 PROCEDURE — 99214 OFFICE O/P EST MOD 30 MIN: CPT | Mod: 25

## 2023-10-18 PROCEDURE — 51741 ELECTRO-UROFLOWMETRY FIRST: CPT

## 2023-10-18 PROCEDURE — 51798 US URINE CAPACITY MEASURE: CPT

## 2023-10-18 RX ORDER — ALFUZOSIN HYDROCHLORIDE 10 MG/1
10 TABLET, EXTENDED RELEASE ORAL DAILY
Qty: 90 | Refills: 3 | Status: ACTIVE | COMMUNITY
Start: 2023-02-08 | End: 1900-01-01

## 2024-04-24 ENCOUNTER — APPOINTMENT (OUTPATIENT)
Dept: UROLOGY | Facility: CLINIC | Age: 49
End: 2024-04-24

## 2024-08-29 NOTE — H&P ADULT - NSHPPHYSICALEXAM_GEN_ALL_CORE
Pt notified and expressed understanding.    Gen:  NAD, resting comfortably  Pulm:  no respiratory distress, nonlabored breathing  Abd: soft, NT/ND, obese  Extrem: warm and well-perfused, warm and well-perfused

## 2025-07-03 NOTE — DIETITIAN INITIAL EVALUATION ADULT. - NS AS NUTRI DX KNOWLEDGE BELIEFS
07/03/25 1347   Patient Belongings   Did you bring any home meds/supplements to the hospital?  No   Patient Belongings other (see comments)   Belongings Search Yes   Clothing Search Yes   Second Staff Ali (3B)     Storage bin: shoes, belt, hat    Box in med room:  sunglasses, lighter    Security env # [id number]: $201.00 cash (sent to security)    A               ADMISSION:    I am responsible for any personal items that are not sent to the safe or pharmacy. [facility] is not responsible for loss, theft or damage of any property in my possession.    Patient Signature _________________________________________ Date/Time _____________________    Staff Signature ___________________________________________ Date/Time _____________________    2nd Staff person, if patient is unable/unwilling to sign    ________________________________________________________ Date/Time _____________________    DISCHARGE:    All personal items have been returned to me.    Patient Signature _________________________________________ Date/Time _____________________    Staff Signature ___________________________________________ Date/Time _____________________    Food - and nutrition - related knowledge deficit